# Patient Record
Sex: FEMALE | URBAN - METROPOLITAN AREA
[De-identification: names, ages, dates, MRNs, and addresses within clinical notes are randomized per-mention and may not be internally consistent; named-entity substitution may affect disease eponyms.]

---

## 2019-09-18 ENCOUNTER — HOSPITAL ENCOUNTER (OUTPATIENT)
Dept: LAB | Age: 64
Discharge: HOME | End: 2019-09-18

## 2019-09-18 LAB
ALBUMIN SERPL-MCNC: 3.3 G/DL (ref 3.2–4.6)
ALBUMIN/GLOB SERPL: 0.7 {RATIO} (ref 1.2–3.5)
ALP SERPL-CCNC: 197 U/L (ref 50–136)
ALT SERPL-CCNC: 30 U/L (ref 12–65)
ANION GAP SERPL CALC-SCNC: 7 MMOL/L (ref 7–16)
AST SERPL-CCNC: 25 U/L (ref 15–37)
BASOPHILS # BLD: 0 K/UL (ref 0–0.2)
BASOPHILS NFR BLD: 0 % (ref 0–2)
BILIRUB SERPL-MCNC: 0.4 MG/DL (ref 0.2–1.1)
BUN SERPL-MCNC: 51 MG/DL (ref 8–23)
CALCIUM SERPL-MCNC: 9.5 MG/DL (ref 8.3–10.4)
CHLORIDE SERPL-SCNC: 96 MMOL/L (ref 98–107)
CO2 SERPL-SCNC: 37 MMOL/L (ref 21–32)
CREAT SERPL-MCNC: 1.04 MG/DL (ref 0.6–1)
DIFFERENTIAL METHOD BLD: ABNORMAL
EOSINOPHIL # BLD: 0.2 K/UL (ref 0–0.8)
EOSINOPHIL NFR BLD: 2 % (ref 0.5–7.8)
ERYTHROCYTE [DISTWIDTH] IN BLOOD BY AUTOMATED COUNT: 15 % (ref 11.9–14.6)
GLOBULIN SER CALC-MCNC: 4.8 G/DL (ref 2.3–3.5)
GLUCOSE SERPL-MCNC: 108 MG/DL (ref 65–100)
HCT VFR BLD AUTO: 44 % (ref 35.8–46.3)
HGB BLD-MCNC: 13.4 G/DL (ref 11.7–15.4)
IMM GRANULOCYTES # BLD AUTO: 0 K/UL (ref 0–0.5)
IMM GRANULOCYTES NFR BLD AUTO: 0 % (ref 0–5)
LYMPHOCYTES # BLD: 2 K/UL (ref 0.5–4.6)
LYMPHOCYTES NFR BLD: 24 % (ref 13–44)
MCH RBC QN AUTO: 25.9 PG (ref 26.1–32.9)
MCHC RBC AUTO-ENTMCNC: 30.5 G/DL (ref 31.4–35)
MCV RBC AUTO: 85.1 FL (ref 79.6–97.8)
MONOCYTES # BLD: 0.6 K/UL (ref 0.1–1.3)
MONOCYTES NFR BLD: 7 % (ref 4–12)
NEUTS SEG # BLD: 5.6 K/UL (ref 1.7–8.2)
NEUTS SEG NFR BLD: 66 % (ref 43–78)
NRBC # BLD: 0 K/UL (ref 0–0.2)
PLATELET # BLD AUTO: 264 K/UL (ref 150–450)
PMV BLD AUTO: 11.1 FL (ref 9.4–12.3)
POTASSIUM SERPL-SCNC: 3.9 MMOL/L (ref 3.5–5.1)
PROT SERPL-MCNC: 8.1 G/DL (ref 6.3–8.2)
RBC # BLD AUTO: 5.17 M/UL (ref 4.05–5.2)
SODIUM SERPL-SCNC: 140 MMOL/L (ref 136–145)
WBC # BLD AUTO: 8.5 K/UL (ref 4.3–11.1)

## 2019-09-18 PROCEDURE — 85025 COMPLETE CBC W/AUTO DIFF WBC: CPT

## 2019-09-18 PROCEDURE — 80053 COMPREHEN METABOLIC PANEL: CPT

## 2024-06-20 ENCOUNTER — PROCEDURE VISIT (OUTPATIENT)
Dept: NEUROLOGY | Age: 69
End: 2024-06-20
Payer: MEDICARE

## 2024-06-20 VITALS — HEIGHT: 61 IN | WEIGHT: 219 LBS | OXYGEN SATURATION: 93 % | BODY MASS INDEX: 41.35 KG/M2 | HEART RATE: 61 BPM

## 2024-06-20 DIAGNOSIS — R20.2 PARESTHESIA: ICD-10-CM

## 2024-06-20 DIAGNOSIS — R53.1 WEAKNESS: ICD-10-CM

## 2024-06-20 DIAGNOSIS — E11.42 DIABETIC POLYNEUROPATHY ASSOCIATED WITH TYPE 2 DIABETES MELLITUS (HCC): Primary | ICD-10-CM

## 2024-06-20 PROCEDURE — 95912 NRV CNDJ TEST 11-12 STUDIES: CPT | Performed by: PSYCHIATRY & NEUROLOGY

## 2024-06-20 PROCEDURE — 95885 MUSC TST DONE W/NERV TST LIM: CPT | Performed by: PSYCHIATRY & NEUROLOGY

## 2024-06-20 NOTE — PROGRESS NOTES
EMG/Nerve Conduction Study Procedure Note  [address]    [address]  [place]  [address]   [phone]      Hx:    Exam:     69 y.o. S W  female      accomp:  sister..  severe Diabetic  A1c  15+ past.   Obesity::  Lost 186 lbs past.  Was 400 lb.  Edema BLE.  Eliquis.  Rollator and poor gait.    Referring:    LARA Hinton                                 [facility]  Technologist: Crystal Iniguez  Height: 5 foot 1 inch   BMI 41.38       Summary     needle EMG selected right upper right lower CV.                 Controlled environmental factors / EMG lab.  Temperature.   NCV : sensory segments:    Abnormal = ABSENT /no response of the right median and ulnar SCV SNAP with abnormal radial slowing and attenuated SNAP.  NCV transcarpal sensory segments:   Markedly abnormal = ABSENT RIGHT MEDIAN TRANSCARPAL SEGMENTS SCV SNAP WITH SLOWED RIGHT ULNAR TRANSCARPAL.  Plantar SCV:          Deferred.  NCV Motor MCV segments:     Severely abnormal = ABSENT /no response of right  tibial and peroneal MCV CMAP.  The proximal right peroneal to the TA = ABSENT.  F-wave studies:        Abnormal = significantly prolonged median and ulnar F waves.  Abnormal.  H-REFLEX Studies:   Deferred.   NEEDLE EMG:   Tested muscles::   Right lower: TA MG = at the gastrocnemius 3+ 3+ 3+ with discrete activity; tibialis anterior = discrete activity with 1+ IA but no PSW or fib.  Right upper = 3+ 3+ 4+ at the right FDI and ADM with 2+ IA and discrete activity right APB.  Right EDC only with DA.      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  NO EVIDENCE FOR MYOPATHY.  THERE IS EVIDENCE FOR AXONAL DENERVATION DIFFUSELY AND DISTAL.              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      Procedure Details:      Correlates with clinical history.  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

## 2025-06-05 ENCOUNTER — HOSPITAL ENCOUNTER (OUTPATIENT)
Dept: PHYSICAL THERAPY | Age: 70
Setting detail: RECURRING SERIES
Discharge: HOME | End: 2025-06-08
Payer: MEDICARE

## 2025-06-05 DIAGNOSIS — I89.0 LYMPHEDEMA, NOT ELSEWHERE CLASSIFIED: Primary | ICD-10-CM

## 2025-06-05 PROCEDURE — 97535 SELF CARE MNGMENT TRAINING: CPT

## 2025-06-05 PROCEDURE — 97166 OT EVAL MOD COMPLEX 45 MIN: CPT

## 2025-06-05 ASSESSMENT — PAIN SCALES - GENERAL: PAINLEVEL_OUTOF10: 0

## 2025-06-05 NOTE — PROGRESS NOTES
Madelin Valera  : 1955  Primary: Medicare Part B (Medicare)  Secondary: MEDICAID [facility] @ [address]  [place] [address]  Phone: [phone]  Fax: [phone]    Plan of Care/Certification Expiration Date:  Certification Period Expiration Date: 25      Frequency/Duration: Plan Frequency: 1x/week for 90 days      Time In/Out:   Time In: 1035  Time Out: 1120    OT Visit Info:  Plan Frequency: 1x/week for 90 days  Progress Note Due Date: 25  Total # of Visits to Date: 1  Progress Note Counter: 1       Visit Count: 1   OUTPATIENT OCCUPATIONAL THERAPY: Treatment Note 2025  Episode: (Lymphedema)         Treatment Diagnosis:    Lymphedema, not elsewhere classified  Medical/Referring Diagnosis:   Lymphedema, not elsewhere classified  Localized edema     Referring Physician:  Shawn Hinton PA MD Orders:  OT Eval and Treat   Return MD Appt:  unknown   Date of Onset:  Onset Date:  (Chronic at least 10 years)     Allergies:  Patient has no allergy information on record.  Restrictions/Precautions:   Lymphedema precautions      Medications Last Reviewed:  2025     Interventions Planned (Treatment may consist of any combination of the following):     See Assessment Note      Subjective Comments:   Patient reports she has wound care 3x/week at home.   >Initial Pain Level:     0/10  >Post Session Pain Level:     0/10  Medications Last Reviewed:  2025  Updated Objective Findings:  See Evaluation Note from today  Treatment   SELF CARE: (15 minutes):   Procedure(s) (per grid) utilized to improve and/or restore self-care/home management as related to  home management of lymphedema . Required moderate visual and verbal cueing to facilitate activities of daily living skills.  Patient educated re: anatomy and physiology of LE lymphatic system as well as complete decongestive therapy for the LE to include skin care, manual lymph drainage,

## 2025-06-05 NOTE — THERAPY EVALUATION
Madelin Valera  : 1955  Primary: Medicare Part B (Medicare)  Secondary: MEDICAID [facility] @ [address]  [place] [address]  Phone: [phone]  Fax: [phone] Plan Frequency: 1x/week for 90 days    Certification Period Expiration Date: 25        Plan of Care/Certification Expiration Date:  Certification Period Expiration Date: 25      Frequency/Duration: Plan Frequency: 1x/week for 90 days      Time In/Out:   Time In: 1035  Time Out: 1120    OT Visit Info:  Plan Frequency: 1x/week for 90 days  Progress Note Due Date: 25  Total # of Visits to Date: 1  Progress Note Counter: 1       Visit Count: 1   OUTPATIENT OCCUPATIONAL THERAPY:Initial Assessment 2025  Episode: (Lymphedema)           Treatment Diagnosis:    Lymphedema, not elsewhere classified  Medical/Referring Diagnosis:    Lymphedema, not elsewhere classified  Localized edema     Referring Physician:  Shawn Hinton PA  Just changed to Dr. Cassy Wing at [facility] - will send plan of care to her  MD Orders:  OT Eval and Treat   Return MD Appt:  unknown  Date of Onset:  Onset Date:  (Chronic at least 10 years)     Allergies:  Patient has no allergy information on record.  Restrictions/Precautions:    Lymphedema precautions      Medications Last Reviewed:  2025     SUBJECTIVE   History of Injury/Illness (Reason for Referral):  Patient currently has [facility], Dr. Hyman, coming out to her home 1x/week to address the wounds on her bilateral LE's. Home care comes out 2x/week to change the dressing on the wounds on bilateral LE's. [facility] - [phone], Latha Cortes [facility]. Wounds on legs began in February and she has used wound vac and then transitioned out of that in late April or early May.   Patient Stated Goal(s):  \"To address the swelling\"  Initial Pain Assessment:     0/10    Post Session Pain Level:     0/10  Past Medical